# Patient Record
Sex: FEMALE | Race: WHITE | Employment: OTHER | URBAN - METROPOLITAN AREA
[De-identification: names, ages, dates, MRNs, and addresses within clinical notes are randomized per-mention and may not be internally consistent; named-entity substitution may affect disease eponyms.]

---

## 2017-05-05 ENCOUNTER — OP HISTORICAL/CONVERTED ENCOUNTER (OUTPATIENT)
Dept: OTHER | Age: 77
End: 2017-05-05

## 2019-05-23 ENCOUNTER — OP HISTORICAL/CONVERTED ENCOUNTER (OUTPATIENT)
Dept: OTHER | Age: 79
End: 2019-05-23

## 2020-07-23 ENCOUNTER — CLINICAL SUPPORT (OUTPATIENT)
Dept: FAMILY MEDICINE CLINIC | Age: 80
End: 2020-07-23
Payer: MEDICARE

## 2020-07-23 DIAGNOSIS — I48.20 CHRONIC ATRIAL FIBRILLATION (HCC): Primary | ICD-10-CM

## 2020-07-23 LAB
INR BLD: 3
PT POC: 36.2 SECONDS
VALID INTERNAL CONTROL?: YES

## 2020-07-23 PROCEDURE — 85610 PROTHROMBIN TIME: CPT | Performed by: FAMILY MEDICINE

## 2020-07-23 RX ORDER — WARFARIN 1 MG/1
1 TABLET ORAL DAILY
COMMUNITY
End: 2020-10-13 | Stop reason: SDUPTHER

## 2020-07-24 ENCOUNTER — TELEPHONE (OUTPATIENT)
Dept: FAMILY MEDICINE CLINIC | Age: 80
End: 2020-07-24

## 2020-07-24 DIAGNOSIS — I87.2 VENOUS REFLUX: Primary | ICD-10-CM

## 2020-07-24 DIAGNOSIS — I48.20 CHRONIC ATRIAL FIBRILLATION (HCC): ICD-10-CM

## 2020-07-24 NOTE — PROGRESS NOTES
Spoke w/ Dr. Reymundo Adams yesterday afternoon, because pt called us to let us know delfino said hold med until Tuesday and she thought that was too long to hold since her number was slightly high. Reymundo Adams said to hold yesterdays dose only and restart Friday night and (because pt mentioned her leg hurting more from a conversation we had 2-3 weeks ago) we ordered a PVL and pt is on her way to have done right now. Pt is still to recheck her coumadin level on Tuesday.

## 2020-07-28 ENCOUNTER — CLINICAL SUPPORT (OUTPATIENT)
Dept: FAMILY MEDICINE CLINIC | Age: 80
End: 2020-07-28
Payer: MEDICARE

## 2020-07-28 VITALS
OXYGEN SATURATION: 97 % | HEIGHT: 63 IN | SYSTOLIC BLOOD PRESSURE: 142 MMHG | HEART RATE: 62 BPM | TEMPERATURE: 97.3 F | BODY MASS INDEX: 24.8 KG/M2 | WEIGHT: 140 LBS | DIASTOLIC BLOOD PRESSURE: 79 MMHG

## 2020-07-28 DIAGNOSIS — I48.20 CHRONIC ATRIAL FIBRILLATION (HCC): Primary | ICD-10-CM

## 2020-07-28 LAB
INR BLD: 2.5
PT POC: NORMAL
VALID INTERNAL CONTROL?: YES

## 2020-07-28 PROCEDURE — 85610 PROTHROMBIN TIME: CPT | Performed by: NURSE PRACTITIONER

## 2020-07-28 PROCEDURE — 93793 ANTICOAG MGMT PT WARFARIN: CPT | Performed by: NURSE PRACTITIONER

## 2020-07-28 RX ORDER — METOPROLOL TARTRATE 50 MG/1
50 TABLET ORAL DAILY
COMMUNITY
Start: 2020-06-15 | End: 2020-10-13 | Stop reason: SDUPTHER

## 2020-07-28 NOTE — PROGRESS NOTES
Iggy Moulton is a 79 y.o. female who presents today for Anticoagulation monitoring. Indication: Atrial Fibrillation  INR Goal: 2.0-3.0. Current dose:  Coumadin 1mg daily. Missed Coumadin Doses:  None  Medication Changes:  no  Dietary Changes:  no    Symptoms: taking coumadin appropriately without any bleeding. Latest INRs:  Lab Results   Component Value Date/Time    INR POC 2.5 07/28/2020 02:55 PM    INR POC 3.0 07/23/2020 02:40 PM        New Coumadin dose:.current treatment plan is effective, no change in therapy. Next check to be scheduled for  2 weeks.

## 2020-08-03 ENCOUNTER — CLINICAL SUPPORT (OUTPATIENT)
Dept: FAMILY MEDICINE CLINIC | Age: 80
End: 2020-08-03
Payer: MEDICARE

## 2020-08-03 VITALS
HEIGHT: 62 IN | OXYGEN SATURATION: 96 % | SYSTOLIC BLOOD PRESSURE: 126 MMHG | BODY MASS INDEX: 25.62 KG/M2 | RESPIRATION RATE: 18 BRPM | DIASTOLIC BLOOD PRESSURE: 79 MMHG | HEART RATE: 62 BPM | WEIGHT: 139.2 LBS | TEMPERATURE: 97.7 F

## 2020-08-03 DIAGNOSIS — I48.20 CHRONIC ATRIAL FIBRILLATION (HCC): Primary | ICD-10-CM

## 2020-08-03 LAB
INR BLD: 2.8
PT POC: 33.7 SECONDS
VALID INTERNAL CONTROL?: YES

## 2020-08-03 PROCEDURE — 85610 PROTHROMBIN TIME: CPT | Performed by: FAMILY MEDICINE

## 2020-08-03 PROCEDURE — 99211 OFF/OP EST MAY X REQ PHY/QHP: CPT | Performed by: FAMILY MEDICINE

## 2020-08-03 NOTE — PROGRESS NOTES
Franck Pal is a 79 y.o. female who presents today for Anticoagulation monitoring. Indication: AFIB  INR Goal: 2-3  Current dose:  Coumadin 1 mg daily. Missed Coumadin Doses:  None  Medication Changes:  No, Continue 1mg daily  Dietary Changes:  no    Symptoms: taking coumadin appropriately     Latest INRs:  Lab Results   Component Value Date/Time    INR POC 2.5 07/28/2020 02:55 PM    INR POC 3.0 07/23/2020 02:40 PM        New Coumadin dose:current treatment plan is effective, no change in therapy    Next check to be scheduled for 4 weeks.

## 2020-08-04 PROBLEM — I10 ESSENTIAL HYPERTENSION: Status: ACTIVE | Noted: 2020-08-04

## 2020-08-04 PROBLEM — H91.90 HEARING LOSS: Status: ACTIVE | Noted: 2020-08-04

## 2020-08-04 PROBLEM — I87.2 VENOUS INSUFFICIENCY OF LEG: Status: ACTIVE | Noted: 2020-08-04

## 2020-08-04 PROBLEM — N39.41 URGE INCONTINENCE OF URINE: Status: ACTIVE | Noted: 2020-08-04

## 2020-08-04 PROBLEM — K21.9 GASTROESOPHAGEAL REFLUX DISEASE: Status: ACTIVE | Noted: 2020-08-04

## 2020-08-04 PROBLEM — M19.90 ARTHRITIS: Status: ACTIVE | Noted: 2020-08-04

## 2020-08-04 PROBLEM — K44.9 HIATAL HERNIA: Status: ACTIVE | Noted: 2020-08-04

## 2020-08-04 PROBLEM — G47.00 INSOMNIA: Status: ACTIVE | Noted: 2020-08-04

## 2020-08-04 PROBLEM — I51.89 DIASTOLIC DYSFUNCTION: Status: ACTIVE | Noted: 2020-08-04

## 2020-08-04 PROBLEM — I25.10 CORONARY ARTERIOSCLEROSIS: Status: ACTIVE | Noted: 2020-08-04

## 2020-08-04 RX ORDER — AMIODARONE HYDROCHLORIDE 100 MG/1
100 TABLET ORAL DAILY
COMMUNITY
End: 2020-10-23

## 2020-09-08 ENCOUNTER — CLINICAL SUPPORT (OUTPATIENT)
Dept: FAMILY MEDICINE CLINIC | Age: 80
End: 2020-09-08
Payer: MEDICARE

## 2020-09-08 VITALS
DIASTOLIC BLOOD PRESSURE: 81 MMHG | TEMPERATURE: 97.8 F | HEART RATE: 60 BPM | WEIGHT: 140 LBS | SYSTOLIC BLOOD PRESSURE: 136 MMHG | HEIGHT: 62 IN | BODY MASS INDEX: 25.76 KG/M2

## 2020-09-08 DIAGNOSIS — I48.20 CHRONIC ATRIAL FIBRILLATION (HCC): Primary | ICD-10-CM

## 2020-09-08 LAB
INR BLD: 2.6
PT POC: NORMAL
VALID INTERNAL CONTROL?: YES

## 2020-09-08 PROCEDURE — 93793 ANTICOAG MGMT PT WARFARIN: CPT | Performed by: FAMILY MEDICINE

## 2020-09-08 PROCEDURE — 85610 PROTHROMBIN TIME: CPT | Performed by: FAMILY MEDICINE

## 2020-09-08 NOTE — PROGRESS NOTES
Michelle Abdi is a 79 y.o. female who presents today for Anticoagulation monitoring. Indication: {indication for anticoagulation:64978} INR Goal: {Inr target:70328}. Current dose:  Coumadin ***mg daily. Missed Coumadin Doses:  {none/this week/over SWNV:33307} Medication Changes:  {yes***/no:90572} Dietary Changes:  {yes***/no:66156} Symptoms: taking coumadin appropriately {findings; bleedin}. Latest INRs: 
Lab Results Component Value Date/Time INR POC 2.8 2020 03:21 PM  
 INR POC 2.5 2020 02:55 PM  
 INR POC 3.0 2020 02:40 PM  
 
  
New Coumadin dose:. {disease follow up plans:726506::\"current treatment plan is effective, no change in therapy\"}. Next check to be scheduled for  {#:92472} weeks.

## 2020-09-08 NOTE — PROGRESS NOTES
Marion Hoff is a 79 y.o. female who presents today for Anticoagulation monitoring. Indication: Atrial Fibrillation  INR Goal: 2.0-3.0. Current dose:  Coumadin 1mg daily. Missed Coumadin Doses:  None  Medication Changes:  no  Dietary Changes:  no    Symptoms: taking coumadin appropriately without any bleeding. Latest INRs:  Lab Results   Component Value Date/Time    INR POC 2.6 09/08/2020 02:30 PM    INR POC 2.8 08/03/2020 03:21 PM    INR POC 2.5 07/28/2020 02:55 PM        New Coumadin dose:.current treatment plan is effective, no change in therapy.     Next check to be scheduled for  1 month

## 2020-10-07 ENCOUNTER — TELEPHONE (OUTPATIENT)
Dept: FAMILY MEDICINE CLINIC | Age: 80
End: 2020-10-07

## 2020-10-07 ENCOUNTER — TELEPHONE ANTICOAG (OUTPATIENT)
Dept: FAMILY MEDICINE CLINIC | Age: 80
End: 2020-10-07

## 2020-10-07 DIAGNOSIS — I48.20 CHRONIC ATRIAL FIBRILLATION (HCC): Primary | ICD-10-CM

## 2020-10-07 NOTE — TELEPHONE ENCOUNTER
PT/ INR checks are now to be done at the hospital per  and standing orders need to be sent to the hospital for them to get instead of going to one of the offices for a nurse visit. Do you want to order this or tell me how often she needs to be checked for standing orders. ?

## 2020-10-08 ENCOUNTER — HOSPITAL ENCOUNTER (OUTPATIENT)
Dept: LAB | Age: 80
Discharge: HOME | End: 2020-10-08
Payer: MEDICARE

## 2020-10-08 DIAGNOSIS — I48.20 CHRONIC ATRIAL FIBRILLATION (HCC): ICD-10-CM

## 2020-10-08 LAB
INR PPP: 2.6 (ref 0.8–1.2)
PROTHROMBIN TIME: 27.6 SEC (ref 11.5–15.2)

## 2020-10-08 PROCEDURE — 85610 PROTHROMBIN TIME: CPT

## 2020-10-08 PROCEDURE — 36415 COLL VENOUS BLD VENIPUNCTURE: CPT

## 2020-10-13 ENCOUNTER — TELEPHONE (OUTPATIENT)
Dept: FAMILY MEDICINE CLINIC | Age: 80
End: 2020-10-13

## 2020-10-13 DIAGNOSIS — I48.20 CHRONIC ATRIAL FIBRILLATION (HCC): Primary | ICD-10-CM

## 2020-10-13 DIAGNOSIS — I10 ESSENTIAL HYPERTENSION: ICD-10-CM

## 2020-10-13 RX ORDER — METOPROLOL TARTRATE 50 MG/1
50 TABLET ORAL DAILY
Qty: 90 TAB | Refills: 1 | Status: SHIPPED | OUTPATIENT
Start: 2020-10-13

## 2020-10-13 RX ORDER — WARFARIN 1 MG/1
1 TABLET ORAL DAILY
Qty: 90 TAB | Refills: 1 | Status: SHIPPED | OUTPATIENT
Start: 2020-10-13

## 2020-10-13 NOTE — TELEPHONE ENCOUNTER
Alethea Aden called for their medication refill on Metoprolol and Warfarin.     Last Office visit: 8/21/2020  Last labs:  10/8/2020  Follow up visit:  Non scheduled currently

## 2020-10-23 ENCOUNTER — VIRTUAL VISIT (OUTPATIENT)
Dept: FAMILY MEDICINE CLINIC | Age: 80
End: 2020-10-23
Payer: MEDICARE

## 2020-10-23 DIAGNOSIS — I48.20 CHRONIC ATRIAL FIBRILLATION (HCC): Primary | ICD-10-CM

## 2020-10-23 DIAGNOSIS — I25.10 CORONARY ARTERIOSCLEROSIS: ICD-10-CM

## 2020-10-23 DIAGNOSIS — K21.9 GASTROESOPHAGEAL REFLUX DISEASE WITHOUT ESOPHAGITIS: ICD-10-CM

## 2020-10-23 DIAGNOSIS — I10 ESSENTIAL HYPERTENSION: ICD-10-CM

## 2020-10-23 PROCEDURE — 99443 PR PHYS/QHP TELEPHONE EVALUATION 21-30 MIN: CPT | Performed by: NURSE PRACTITIONER

## 2020-10-23 NOTE — PROGRESS NOTES
Kavitha Sheets presents today for   Chief Complaint   Patient presents with    Hypertension     follow up        Depression Screening:  3 most recent PHQ Screens 10/23/2020   Little interest or pleasure in doing things Not at all   Feeling down, depressed, irritable, or hopeless Not at all   Total Score PHQ 2 0       Learning Assessment:  No flowsheet data found. Abuse Screening:  Abuse Screening Questionnaire 10/23/2020   Do you ever feel afraid of your partner? N   Are you in a relationship with someone who physically or mentally threatens you? N   Is it safe for you to go home? Y       Fall Risk  Fall Risk Assessment, last 12 mths 10/23/2020   Able to walk? Yes   Fall in past 12 months? No       ADL  ADL Assessment 10/23/2020   Feeding yourself No Help Needed   Getting from bed to chair No Help Needed   Getting dressed No Help Needed   Bathing or showering No Help Needed   Walk across the room (includes cane/walker) No Help Needed   Using the telphone No Help Needed   Taking your medications No Help Needed   Preparing meals No Help Needed   Managing money (expenses/bills) No Help Needed   Moderately strenuous housework (laundry) No Help Needed   Shopping for personal items (toiletries/medicines) No Help Needed   Shopping for groceries No Help Needed   Driving No Help Needed   Climbing a flight of stairs No Help Needed   Getting to places beyond walking distances No Help Needed       Health Maintenance reviewed and discussed and ordered per Provider. Health Maintenance Due   Topic Date Due    DTaP/Tdap/Td series (1 - Tdap) 02/22/1960    Shingrix Vaccine Age 50> (1 of 2) 02/22/1989    GLAUCOMA SCREENING Q2Y  02/22/2004    Bone Densitometry (Dexa) Screening  02/22/2004    Pneumococcal 65+ years (1 of 1 - PPSV23) 02/22/2004    Medicare Yearly Exam  05/18/2020    Flu Vaccine (1) 09/01/2020   . Coordination of Care:  1. Have you been to the ER, urgent care clinic since your last visit?  Hospitalized since your last visit? No    2. Have you seen or consulted any other health care providers outside of the [address] [place] [address] since your last visit? Include any pap smears or colon screening.  No

## 2020-10-23 NOTE — PROGRESS NOTES
I am seeing this patient today virtually using HIPAA-compliant video-conferencing technology due to the COVID-19 Pandemic. The patient has previously provided full consent to use this technology and understands the risks and benefits of proceeding. I am seeing the patient today from my office in [place], [place]. The patient is in his/her home located within [place]. Patient already made aware that this is a virtual visit with provider and that for the purposes of billing, we will submit a claim for reimbursement with your insurance company. He/She was informed that he/she will be responsible for any copays, coinsurance amounts or other amounts not covered by his/her insurance company. Patient consented and accepted terms of virtual visit. THIS VISIT WAS CONDUCTED OVER THE TELEPHONE    HPI    Jessika Yuan is a 79 y.o. female and presents today for Hypertension (follow up )    She has a hx of Hypertension, AFib, CAD, venous insufficiency. She sees Cardiology regularly. She takes daily Coumadin for her AFib. -HTN: on metoprolol and tolerating; followed by cardiologist; no dizziness, LE edema or palpitations; checks bp on occasion and reports \"normal\"   -A fib: on coumadin only  -HLD: diet controlled  -GERD: stable; denies s/s or use of medication    -Last urine protein: unsure  -Colonoscopy: 2010, declines further  -DEXA: 12/2014, declines further  -Mammogram: 2010, declines further    -Flu vaccine: 2019  -Shingles vaccine: never had, does not want right now. -Pneumonia vaccine: 2017    She also recently moved to [place], [place] and will be looking for new provider closer to her; she will let us know if she needs anything. Pharmacy is [facility] in [facility]    Allergies    No Known Allergies     Medications    Current Outpatient Medications   Medication Sig Dispense    warfarin (Coumadin) 1 mg tablet Take 1 Tab by mouth daily.  Indications: treatment to prevent blood clots in chronic atrial fibrillation 90 Tab    metoprolol tartrate (LOPRESSOR) 50 mg tablet Take 1 Tab by mouth daily. Indications: high blood pressure 90 Tab     No current facility-administered medications for this visit. Screening  phq neg    Health Maintenance    Health Maintenance Due   Topic Date Due    DTaP/Tdap/Td series (1 - Tdap) 02/22/1960    Shingrix Vaccine Age 50> (1 of 2) 02/22/1989    GLAUCOMA SCREENING Q2Y  02/22/2004    Bone Densitometry (Dexa) Screening  02/22/2004    Pneumococcal 65+ years (1 of 1 - PPSV23) 02/22/2004    Medicare Yearly Exam  05/18/2020    Flu Vaccine (1) 09/01/2020        Problem List    Patient Active Problem List    Diagnosis Date Noted    Arthritis 08/04/2020    Coronary arteriosclerosis 08/22/3514    Diastolic dysfunction 13/20/4186    Essential hypertension 08/04/2020    Hearing loss 08/04/2020    Gastroesophageal reflux disease 08/04/2020    Hiatal hernia 08/04/2020    Insomnia 08/04/2020    Urge incontinence of urine 08/04/2020    Venous insufficiency of leg 08/04/2020    Chronic atrial fibrillation ([facility] [address] 07/28/2020        Family Hx    History reviewed. No pertinent family history. Social Hx    Social History     Socioeconomic History    Marital status:      Spouse name: Not on file    Number of children: Not on file    Years of education: Not on file    Highest education level: Not on file   Tobacco Use    Smoking status: Former Smoker    Smokeless tobacco: Never Used   Substance and Sexual Activity    Alcohol use: Never     Frequency: Never        Surgical Hx    Past Surgical History:   Procedure Laterality Date    HX HEENT      stapesis    HX HYSTERECTOMY          Vitals    There were no vitals taken for this visit. No flowsheet data found. ROS    Review of Systems   Constitutional: Negative for chills, fever and malaise/fatigue. HENT: Negative for congestion, ear pain, sinus pain and sore throat. Eyes: Negative for blurred vision and redness. Respiratory: Negative for cough, sputum production, shortness of breath and wheezing. Cardiovascular: Negative for chest pain, palpitations and leg swelling. Gastrointestinal: Negative for abdominal pain, constipation, diarrhea, heartburn, nausea and vomiting. Genitourinary: Negative for dysuria and hematuria. Musculoskeletal: Negative for falls, joint pain and myalgias. Skin: Negative for rash. Neurological: Negative for dizziness, seizures, weakness and headaches. Endo/Heme/Allergies: Does not bruise/bleed easily. Psychiatric/Behavioral: Negative for depression and suicidal ideas. The patient does not have insomnia. Physical Exam    Patient is a 79y.o. year old female     Physical exam was deferred due to [facility]- [facility] precautions as visit was completed via telemedicine. Assessment/Plan  1. Chronic atrial fibrillation (HCC)  Cont coumadin therapy with monthly INR checks as long as within range; cont cards follow up; she will be looking for new provider closer to her prior to her next INR check due next month    2. Coronary arteriosclerosis  Cont cards follow up    3. Essential hypertension  Cont current regimen    4. Gastroesophageal reflux disease without esophagitis  Stable without medication at this time         Health Maintenance Items reviewed with patient as noted. 21 minutes spent with patient/reviewing records during virtual appt discussing diagnoses, treatment options, and answering any patient questions.     Venkatesh Tenorio, MSN, FNP-BC

## 2020-11-30 ENCOUNTER — TELEPHONE (OUTPATIENT)
Dept: FAMILY MEDICINE CLINIC | Age: 80
End: 2020-11-30

## 2020-12-02 ENCOUNTER — DOCUMENTATION ONLY (OUTPATIENT)
Dept: FAMILY MEDICINE CLINIC | Age: 80
End: 2020-12-02

## 2020-12-04 ENCOUNTER — DOCUMENTATION ONLY (OUTPATIENT)
Dept: FAMILY MEDICINE CLINIC | Age: 80
End: 2020-12-04

## 2022-03-16 NOTE — PROGRESS NOTES
INR today is 3.0 The INR is above the therapeutic range. Coumadin dosing: taking 1mg QD  Repeat the INR on 7/28/2020      Patient instructed to hold Coumadin until Tuesday and recheck on Tuesday. Also instructed patient to call [facility] office for a VV follow up per Dr. Nanci Santana. Detail Level: Detailed Detail Level: Zone